# Patient Record
(demographics unavailable — no encounter records)

---

## 2025-05-19 NOTE — HISTORY OF PRESENT ILLNESS
[7] : 7 [3] : 3 [de-identified] : 05/19/2025:  2 weeks lateral ankle pain. no injury. no treatment to date. no prior ankle probs. denies dm. +vape. student [employer] [] : no [FreeTextEntry1] : left foot/ankle

## 2025-05-19 NOTE — ASSESSMENT
[FreeTextEntry1] : wbat brace -- states has at home ice/elevate nsaids prn PT f/up 6 wks  The patient's current medication management of their orthopedic diagnosis was reviewed today:  (1) We discussed a comprehensive treatment plan that included possible pharmaceutical management involving the use of prescription strength medications including but not limited to options such as oral Naprosyn 500mg BID, once daily Meloxicam 15 mg, or 500-650 mg Tylenol versus over the counter oral medications and topical prescription NSAID Pennsaid vs over the counter Voltaren gel. (2) There is a moderate risk of morbidity with further treatment, especially from use of prescription strength medications and possible side effects of these medications which include upset stomach with oral medications, skin reactions to topical medications and cardiac/renal issues with long term use. (3) I recommended that the patient follow-up with their medical physician to discuss any significant specific potential issues with long term medication use such as interactions with current medications or with exacerbation of underlying medical comorbidities. (4) The benefits and risks associated with use of injectable, oral or topical, prescription and over the counter anti-inflammatory medications were discussed with the patient. The patient voiced understanding of the risks including but not limited to bleeding, stroke, kidney dysfunction, heart disease, and were referred to the black box warning label for further information.

## 2025-05-19 NOTE — IMAGING
HD X 3 hrs. Net UF: 1.65L. HD cut short today per phone order of Dr Rasheed, nephrologist. Per Dr Rasheed, pt's schedule is changed to [facility], thus pt will have another session tomorrow. Related info to the pt. Pt accept it without complain. Pt is stable. Sleeping most of the time during dialysis. BP stable. Report given to primary [id number] RN. Next HD as ordered.    [Left] : left ankle [There are no fractures, subluxations or dislocations. No significant abnormalities are seen] : There are no fractures, subluxations or dislocations. No significant abnormalities are seen